# Patient Record
Sex: MALE | Race: WHITE | NOT HISPANIC OR LATINO | Employment: PART TIME | URBAN - METROPOLITAN AREA
[De-identification: names, ages, dates, MRNs, and addresses within clinical notes are randomized per-mention and may not be internally consistent; named-entity substitution may affect disease eponyms.]

---
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
## 2018-01-12 NOTE — PROGRESS NOTES
Assessment    1  Dyshidrotic eczema (705 81) (L30 1)   2  Cellulitis (682 9) (L03 90)    Plan  Dyshidrotic eczema    · Cephalexin 500 MG Oral Tablet (Cephalexin Monohydrate); 1 BID   · Triamcinolone Acetonide 0 5 % External Ointment; APPLY 2-3 TIMES DAILY TO  AFFECTED AREA(S)    Chief Complaint  Routine PE      Advance Directives  Advance Directive [facility]:   NO - Patient does not have an advance health care directive  History of Present Illness  HM, Adult Male: The patient is being seen for a health maintenance evaluation  Social History: Household members include parents  He is unmarried  Work status: working full time  The patient is a current cigarette smoker  He is quitting on his own  He reports never drinking alcohol and denies binge drinking  The patient has no concerns about alcohol abuse  He has never used illicit drugs  General Health: The patient's health since the last visit is described as good  He has regular dental visits  He denies vision problems  He denies hearing loss  Immunizations status: up to date  Lifestyle:  He does not have a healthy diet  He does not have any weight concerns  He exercises regularly  He uses tobacco  He denies alcohol use  He denies drug use  Reproductive health:  the patient is sexually active  Screening: Active Problems    1  Acute streptococcal tonsillitis (034 0) (J03 00)   2  Dyshidrotic eczema (705 81) (L30 1)   3  Pink eye (372 03) (H10 029)    Past Medical History    · History of Mononucleosis (075) (B27 90)    Family History  Father    · Family history of hypertension (V17 49) (Z82 49)   · Family history of High cholesterol    Social History    · Never a smoker    Current Meds   1  Ciprofloxacin HCl - 0 3 % Ophthalmic Solution; 1GTT AFFECTED EYE EVERY 6-8   HOURS; Therapy: [id number] to (Last Rx:23May2016)  Requested for: [id number] Ordered   2   Clotrimazole-Betamethasone 1-0 05 % External Cream; APPLY  AND RUB  IN A THIN   FILM TO AFFECTED AREAS TWICE DAILY  (AM AND PM); Therapy: [id number] to (Last Rx:11Mar2017) Ordered   3  Triamcinolone Acetonide 0 5 % External Ointment; APPLY 2-3 TIMES DAILY TO   AFFECTED AREA(S); Therapy: [id number] to (Last Rx:11Mar2017)  Requested for: [id number] Ordered    Allergies    1  No Known Drug Allergies    Vitals   Recorded: 20Nov2017 01:57PM   Heart Rate 104   Systolic 147   Diastolic 70   Height 6 ft 1 in   Weight 300 lb    BMI Calculated 39 58   BSA Calculated 2 56   BMI Percentile 99 %   2-20 Stature Percentile 89 %   2-20 Weight Percentile 99 %   O2 Saturation 97     Physical Exam    Constitutional   General appearance: No acute distress, well appearing and well nourished  Neck   Neck: Supple, symmetric, trachea midline, no masses  Thyroid: Normal, no thyromegaly  Pulmonary   Auscultation of lungs: Clear to auscultation  Cardiovascular   Auscultation of heart: Normal rate and rhythm, normal S1 and S2, no murmurs  Pedal pulses: 2+ bilaterally  Abdomen   Abdomen: Non-tender, no masses  Liver and spleen: No hepatomegaly or splenomegaly  Results/Data  PHQ-2 Adult Depression Screening 20Nov2017 02:00PM User, s     Test Name Result Flag Reference   PHQ-2 Adult Depression Score 0     Over the last two weeks, how often have you been bothered by any of the following problems?   Little interest or pleasure in doing things: Not at all - 0  Feeling down, depressed, or hopeless: Not at all - 0   PHQ-2 Adult Depression Screening Negative         Signatures   Electronically signed by : Calvin Messer MD; Nov 20 2017  2:23PM EST                       (Author)

## 2018-01-14 VITALS
WEIGHT: 300 LBS | OXYGEN SATURATION: 97 % | HEART RATE: 101 BPM | DIASTOLIC BLOOD PRESSURE: 70 MMHG | BODY MASS INDEX: 39.76 KG/M2 | HEIGHT: 73 IN | SYSTOLIC BLOOD PRESSURE: 128 MMHG

## 2018-01-18 NOTE — PROGRESS NOTES
Assessment    1  Dyshidrotic eczema (705 81) (L30 1)    Plan  Dyshidrotic eczema    · Clotrimazole-Betamethasone 1-0 05 % External Cream; APPLY  AND RUB  IN A  THIN FILM TO AFFECTED AREAS TWICE DAILY  (AM AND PM)   · Triamcinolone Acetonide 0 5 % External Ointment; APPLY 2-3 TIMES DAILY TO  AFFECTED AREA(S)    Chief Complaint  ATHLETES FOOT      History of Present Illness  HPI: see CC      Review of Systems    Constitutional: no fever or chills, feels well, no tiredness, no recent weight loss or weight gain  Integumentary: as noted in HPI  Active Problems    1  Acute streptococcal tonsillitis (034 0) (J03 00)   2  Pink eye (372 03) (H10 029)    Past Medical History    1  History of Mononucleosis (075) (B27 90)  Active Problems And Past Medical History Reviewed: The active problems and past medical history were reviewed and updated today  Family History  Father    1  Family history of hypertension (V17 49) (Z82 49)   2  Family history of High cholesterol    Social History    · Never a smoker    Current Meds   1  Ciprofloxacin HCl - 0 3 % Ophthalmic Solution; 1GTT AFFECTED EYE EVERY 6-8   HOURS; Therapy: [id number] to (Last Rx:[id number])  Requested for: 41YVJ3354 Ordered    Allergies    1  No Known Drug Allergies    Vitals   Recorded: 01MSJ8358 53:67NU   Systolic 046   Diastolic 76   Height 6 ft 1 in   Weight 300 lb    BMI Calculated 39 58   BSA Calculated 2 56   BMI Percentile 99 %   2-20 Stature Percentile 89 %   2-20 Weight Percentile 99 %     Physical Exam    Constitutional   General appearance: No acute distress, well appearing and well nourished  Skin   Examination of the skin for lesions: Abnormal   feet eczema          Signatures   Electronically signed by : Ruebns Devine MD; Mar 11 2017  9:08AM EST                       (Author)

## 2018-01-22 VITALS
DIASTOLIC BLOOD PRESSURE: 76 MMHG | HEIGHT: 73 IN | SYSTOLIC BLOOD PRESSURE: 120 MMHG | BODY MASS INDEX: 39.76 KG/M2 | WEIGHT: 300 LBS

## 2018-04-26 ENCOUNTER — HOSPITAL ENCOUNTER (EMERGENCY)
Facility: HOSPITAL | Age: 18
Discharge: HOME | End: 2018-04-26
Admitting: EMERGENCY MEDICINE
Payer: OTHER MISCELLANEOUS

## 2018-04-26 VITALS
DIASTOLIC BLOOD PRESSURE: 98 MMHG | RESPIRATION RATE: 20 BRPM | BODY MASS INDEX: 40.63 KG/M2 | WEIGHT: 300 LBS | OXYGEN SATURATION: 98 % | SYSTOLIC BLOOD PRESSURE: 184 MMHG | HEIGHT: 72 IN | TEMPERATURE: 98.5 F | HEART RATE: 114 BPM

## 2018-04-26 DIAGNOSIS — S81.812A LACERATION OF LEFT LOWER EXTREMITY, INITIAL ENCOUNTER: Primary | ICD-10-CM

## 2018-04-26 PROCEDURE — 99283 EMERGENCY DEPT VISIT LOW MDM: CPT

## 2018-04-26 PROCEDURE — 90471 IMMUNIZATION ADMIN: CPT

## 2018-04-26 PROCEDURE — 90715 TDAP VACCINE 7 YRS/> IM: CPT | Performed by: PHYSICIAN ASSISTANT

## 2018-04-26 RX ORDER — GINSENG 100 MG
1 CAPSULE ORAL ONCE
Status: COMPLETED | OUTPATIENT
Start: 2018-04-26 | End: 2018-04-26

## 2018-04-26 RX ORDER — LIDOCAINE HYDROCHLORIDE 10 MG/ML
5 INJECTION, SOLUTION EPIDURAL; INFILTRATION; INTRACAUDAL; PERINEURAL ONCE
Status: COMPLETED | OUTPATIENT
Start: 2018-04-26 | End: 2018-04-26

## 2018-04-26 RX ADMIN — LIDOCAINE HYDROCHLORIDE 5 ML: 10 INJECTION, SOLUTION EPIDURAL; INFILTRATION; INTRACAUDAL; PERINEURAL at 15:30

## 2018-04-26 RX ADMIN — Medication 1 SMALL APPLICATION: at 15:35

## 2018-04-26 RX ADMIN — TETANUS TOXOID, REDUCED DIPHTHERIA TOXOID AND ACELLULAR PERTUSSIS VACCINE, ADSORBED 0.5 ML: 5; 2.5; 8; 8; 2.5 SUSPENSION INTRAMUSCULAR at 16:00
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
## 2018-04-26 NOTE — DISCHARGE INSTRUCTIONS
Watch for signs of infection (redness, swelling, warmth, pus or drainage, streaking redness or fevers)  If they develop follow up or return to the ER  Laceration   WHAT YOU NEED TO KNOW:   A laceration is an injury to the skin and the soft tissue underneath it  Lacerations happen when you are cut or hit by something  They can happen anywhere on the body  DISCHARGE INSTRUCTIONS:   Return to the emergency department if:   · You have heavy bleeding or bleeding that does not stop after 10 minutes of holding firm, direct pressure over the wound  · Your wound opens up  Contact your healthcare provider if:   · You have a fever or chills  · Your laceration is red, warm, or swollen  · You have red streaks on your skin coming from your wound  · You have white or yellow drainage from the wound that smells bad  · You have pain that gets worse, even after treatment  · You have questions or concerns about your condition or care  Medicines:   · Prescription pain medicine  may be given  Ask how to take this medicine safely  · Antibiotics  help treat or prevent a bacterial infection  · Take your medicine as directed  Contact your healthcare provider if you think your medicine is not helping or if you have side effects  Tell him or her if you are allergic to any medicine  Keep a list of the medicines, vitamins, and herbs you take  Include the amounts, and when and why you take them  Bring the list or the pill bottles to follow-up visits  Carry your medicine list with you in case of an emergency  Care for your wound as directed:   · Do not get your wound wet  until your healthcare provider says it is okay  Do not soak your wound in water  Do not go swimming until your healthcare provider says it is okay  Carefully wash the wound with soap and water  Gently pat the area dry or allow it to air dry  · Change your bandages  when they get wet, dirty, or after washing   Apply new, clean bandages as directed  Do not apply elastic bandages or tape too tight  Do not put powders or lotions over your incision  · Apply antibiotic ointment as directed  Your healthcare provider may give you antibiotic ointment to put over your wound if you have stitches  If you have strips of tape over your incision, let them dry up and fall off on their own  If they do not fall off within 14 days, gently remove them  If you have glue over your wound, do not remove or pick at it  If your glue comes off, do not replace it with glue that you have at home  · Check your wound every day for signs of infection such as swelling, redness, or pus  Self-care:   · Apply ice  on your wound for 15 to 20 minutes every hour or as directed  Use an ice pack, or put crushed ice in a plastic bag  Cover it with a towel  Ice helps prevent tissue damage and decreases swelling and pain  · Use a splint as directed  A splint will decrease movement and stress on your wound  It may help it heal faster  A splint may be used for lacerations over joints or areas of your body that bend  Ask your healthcare provider how to apply and remove a splint  · Decrease scarring of your wound  by applying ointments as directed  Do not apply ointments until your healthcare provider says it is okay  You may need to wait until your wound is healed  Ask which ointment to buy and how often to use it  After your wound is healed, use sunscreen over the area when you are out in the sun  You should do this for at least 6 months to 1 year after your injury  Follow up with your healthcare provider as directed: You may need to follow up in 24 to 48 hours to have your wound checked for infection  You will need to return in 3 to 14 days if you have stitches or staples so they can be removed  Care for your wound as directed to prevent infection and help it heal  Write down your questions so you remember to ask them during your visits    © 2017 [address]  Information is for End User's use only and may not be sold, redistributed or otherwise used for commercial purposes  All illustrations and images included in CareNotes® are the copyrighted property of [employer]  or [facility]  The above information is an  only  It is not intended as medical advice for individual conditions or treatments  Talk to your doctor, nurse or pharmacist before following any medical regimen to see if it is safe and effective for you  Care For Your Stitches   WHAT YOU NEED TO KNOW:   Stitches, or sutures, are used to close cuts and wounds on the skin  Stitches need to be removed after your wound has healed  DISCHARGE INSTRUCTIONS:   Return to the emergency department if:   · Your stitches come apart  · Blood soaks through your bandages  · You suddenly cannot move your injured joint  · You have sudden numbness around your wound  · You see red streaks coming from your wound  Contact your healthcare provider if:   · You have a fever and chills  · Your wound is red, warm, swollen, or leaking pus  · There is a bad smell coming from your wound  · You have increased pain in the wound area  · You have questions or concerns about your condition or care  Care for your stitches:  Keep your stitches clean and dry  You may need to cover your stitches with a bandage for 24 to 48 hours, or as directed  Do not bump or hit the suture area, as this could open the wound  Do not trim or shorten the ends of your stitches  If they rub on your clothing, put a gauze bandage between the stitches and your clothes  Clean your wound:  Carefully wash your wound with soap and water  For mouth and lip wounds, rinse your mouth after meals and at bedtime  Ask your healthcare provider what to use to rinse your mouth  If you have a scalp wound, you may gently wash your hair every 2 days with mild shampoo  Do not use hair products, such as hair spray    Help your wound heal:   · Elevate  your wound above the level of your heart as often as you can  This will help decrease swelling and pain  Prop your wound on pillows or blankets to keep it elevated comfortably  · Limit activity  Do not stretch the skin around your wound  This will help prevent bleeding and swelling of the wound area  Follow up with your healthcare provider as directed: You may need to return to have your stitches removed  Write down your questions so you remember to ask them during your visits  © 2017 [address] Information is for End User's use only and may not be sold, redistributed or otherwise used for commercial purposes  All illustrations and images included in CareNotes® are the copyrighted property of [employer]  or [employer]  The above information is an  only  It is not intended as medical advice for individual conditions or treatments  Talk to your doctor, nurse or pharmacist before following any medical regimen to see if it is safe and effective for you

## 2018-06-11 ENCOUNTER — TELEPHONE (OUTPATIENT)
Dept: FAMILY MEDICINE CLINIC | Facility: CLINIC | Age: 18
End: 2018-06-11

## 2018-06-22 DIAGNOSIS — R52 PAIN: Primary | ICD-10-CM

## 2018-06-22 RX ORDER — CLOTRIMAZOLE AND BETAMETHASONE DIPROPIONATE 10; .64 MG/G; MG/G
CREAM TOPICAL 2 TIMES DAILY
COMMUNITY
Start: 2017-03-11 | End: 2019-02-12 | Stop reason: ALTCHOICE

## 2018-06-22 RX ORDER — TRIAMCINOLONE ACETONIDE 5 MG/G
OINTMENT TOPICAL 3 TIMES DAILY
COMMUNITY
Start: 2017-03-11 | End: 2019-02-12 | Stop reason: ALTCHOICE

## 2018-06-22 RX ORDER — PREDNISONE 20 MG/1
TABLET ORAL
Qty: 15 TABLET | Refills: 0 | Status: SHIPPED | OUTPATIENT
Start: 2018-06-22 | End: 2018-07-27 | Stop reason: SDUPTHER

## 2018-06-22 NOTE — TELEPHONE ENCOUNTER
ECZEMA EXTREMELY BAD ON FEET  WORSE THAN EVER  IN PAIN AND DIFFICULT TO WALK  USING CREAM HE WAS GIVEN FOR THIS  WHAT ELSE CAN THEY DO?    [phone]

## 2018-07-27 DIAGNOSIS — R52 PAIN: ICD-10-CM

## 2018-07-27 RX ORDER — PREDNISONE 20 MG/1
TABLET ORAL
Qty: 15 TABLET | Refills: 0 | Status: SHIPPED | OUTPATIENT
Start: 2018-07-27 | End: 2019-02-12 | Stop reason: ALTCHOICE

## 2019-02-12 ENCOUNTER — OFFICE VISIT (OUTPATIENT)
Dept: FAMILY MEDICINE CLINIC | Facility: CLINIC | Age: 19
End: 2019-02-12
Payer: COMMERCIAL

## 2019-02-12 VITALS
WEIGHT: 300 LBS | HEIGHT: 72 IN | HEART RATE: 76 BPM | OXYGEN SATURATION: 98 % | BODY MASS INDEX: 40.63 KG/M2 | SYSTOLIC BLOOD PRESSURE: 126 MMHG | TEMPERATURE: 98.1 F | DIASTOLIC BLOOD PRESSURE: 88 MMHG

## 2019-02-12 DIAGNOSIS — J01.00 ACUTE MAXILLARY SINUSITIS, RECURRENCE NOT SPECIFIED: Primary | ICD-10-CM

## 2019-02-12 PROCEDURE — 3008F BODY MASS INDEX DOCD: CPT | Performed by: NURSE PRACTITIONER

## 2019-02-12 PROCEDURE — 1036F TOBACCO NON-USER: CPT | Performed by: NURSE PRACTITIONER

## 2019-02-12 PROCEDURE — 99213 OFFICE O/P EST LOW 20 MIN: CPT | Performed by: NURSE PRACTITIONER

## 2019-02-12 RX ORDER — AMOXICILLIN AND CLAVULANATE POTASSIUM 875; 125 MG/1; MG/1
1 TABLET, FILM COATED ORAL EVERY 12 HOURS SCHEDULED
Qty: 20 TABLET | Refills: 0 | Status: SHIPPED | OUTPATIENT
Start: 2019-02-12 | End: 2019-02-22

## 2019-02-12 RX ORDER — PREDNISONE 20 MG/1
TABLET ORAL
Qty: 15 TABLET | Refills: 0 | Status: SHIPPED | OUTPATIENT
Start: 2019-02-12 | End: 2019-09-05 | Stop reason: ALTCHOICE

## 2019-02-12 NOTE — PROGRESS NOTES
[facility]        NAME: Johnny Olivares is a 22 y o  male  : 2000    MRN: [id number]  DATE: 2019  TIME: 9:26 AM    Assessment and Plan   Acute maxillary sinusitis, recurrence not specified [J01 00]  1  Acute maxillary sinusitis, recurrence not specified  amoxicillin-clavulanate (AUGMENTIN) 875-125 mg per tablet    predniSONE 20 mg tablet         Patient Instructions     Patient Instructions   Discussed viral vs bacterial infection  Augmentin and Prednisone as directed  Continue OTC cough/cold medications as directed  Call or return for problems/concerns              Chief Complaint     Chief Complaint   Patient presents with    Sore Throat     for 2 days-- slight cough          History of Present Illness       Sinus congestion, productive cough, sore throat started a few days ago  Symptoms are getting worse  Tried DayQuil with no relief  No fever      Review of Systems   Review of Systems   Constitutional: Negative for activity change, chills, fatigue and fever  HENT: Positive for congestion, postnasal drip, rhinorrhea, sinus pressure and sore throat  Negative for ear pain  Eyes: Negative for pain, discharge and redness  Respiratory: Positive for cough and chest tightness  Negative for shortness of breath and wheezing  Cardiovascular: Negative for chest pain  Gastrointestinal: Negative for constipation, diarrhea, nausea and vomiting  Musculoskeletal: Negative for myalgias  Skin: Negative for rash  Neurological: Negative for dizziness and headaches           Current Medications       Current Outpatient Medications:     amoxicillin-clavulanate (AUGMENTIN) 875-125 mg per tablet, Take 1 tablet by mouth every 12 (twelve) hours for 10 days, Disp: 20 tablet, Rfl: 0    predniSONE 20 mg tablet, 20mg po bid x 5 days, then 20mg po daily x 5 days, Disp: 15 tablet, Rfl: 0    Current Allergies     Allergies as of 2019    (No Known Allergies) The following portions of the patient's history were reviewed and updated as appropriate: allergies, current medications, past family history, past medical history, past social history, past surgical history and problem list      History reviewed  No pertinent past medical history  History reviewed  No pertinent surgical history  History reviewed  No pertinent family history  Medications have been verified  Objective   /88   Pulse 76   Temp 98 1 °F (36 7 °C) (Oral)   Ht 6' (1 829 m)   Wt 136 kg (300 lb)   SpO2 98%   BMI 40 69 kg/m²        Physical Exam     Physical Exam   Constitutional: He is oriented to person, place, and time  He appears well-developed and well-nourished  No distress  HENT:   Head: Normocephalic and atraumatic  Right Ear: Tympanic membrane, external ear and ear canal normal    Left Ear: Tympanic membrane, external ear and ear canal normal    Nose: Rhinorrhea present  No epistaxis  Right sinus exhibits maxillary sinus tenderness  Left sinus exhibits maxillary sinus tenderness  Mouth/Throat: Uvula is midline and mucous membranes are normal  Posterior oropharyngeal erythema present  Cardiovascular: Normal rate, regular rhythm and normal heart sounds  Exam reveals no gallop and no friction rub  No murmur heard  Pulmonary/Chest: Effort normal and breath sounds normal  No respiratory distress  He has no wheezes  He has no rales  Abdominal: He exhibits no distension  Musculoskeletal: Normal range of motion  Neurological: He is alert and oriented to person, place, and time  Skin: He is not diaphoretic  Psychiatric: He has a normal mood and affect  His behavior is normal  Judgment and thought content normal    Nursing note and vitals reviewed

## 2019-02-12 NOTE — PATIENT INSTRUCTIONS
Discussed viral vs bacterial infection  Augmentin and Prednisone as directed  Continue OTC cough/cold medications as directed  Call or return for problems/concerns

## 2019-09-05 ENCOUNTER — OFFICE VISIT (OUTPATIENT)
Dept: FAMILY MEDICINE CLINIC | Facility: CLINIC | Age: 19
End: 2019-09-05
Payer: COMMERCIAL

## 2019-09-05 VITALS
SYSTOLIC BLOOD PRESSURE: 124 MMHG | HEART RATE: 81 BPM | DIASTOLIC BLOOD PRESSURE: 74 MMHG | HEIGHT: 72 IN | OXYGEN SATURATION: 97 % | WEIGHT: 308 LBS | BODY MASS INDEX: 41.72 KG/M2

## 2019-09-05 DIAGNOSIS — J01.10 ACUTE NON-RECURRENT FRONTAL SINUSITIS: Primary | ICD-10-CM

## 2019-09-05 DIAGNOSIS — Z00.00 WELLNESS EXAMINATION: ICD-10-CM

## 2019-09-05 PROCEDURE — 99213 OFFICE O/P EST LOW 20 MIN: CPT | Performed by: FAMILY MEDICINE

## 2019-09-05 PROCEDURE — 3008F BODY MASS INDEX DOCD: CPT | Performed by: FAMILY MEDICINE

## 2019-09-05 PROCEDURE — 99395 PREV VISIT EST AGE 18-39: CPT | Performed by: FAMILY MEDICINE

## 2019-09-05 RX ORDER — AMOXICILLIN 500 MG/1
500 CAPSULE ORAL EVERY 8 HOURS SCHEDULED
Qty: 30 CAPSULE | Refills: 0 | Status: SHIPPED | OUTPATIENT
Start: 2019-09-05 | End: 2019-09-15

## 2019-09-05 RX ORDER — PREDNISOLONE 15 MG/5 ML
SOLUTION, ORAL ORAL
Qty: 70 ML | Refills: 1 | Status: SHIPPED | OUTPATIENT
Start: 2019-09-05

## 2019-09-05 RX ORDER — FLUTICASONE PROPIONATE 50 MCG
1 SPRAY, SUSPENSION (ML) NASAL DAILY
Qty: 1 BOTTLE | Refills: 5 | Status: SHIPPED | OUTPATIENT
Start: 2019-09-05

## 2019-09-05 NOTE — PROGRESS NOTES
[facility]        NAME: James Wilson is a 22 y o  male  : 2000    MRN: [id number]  DATE: 2019  TIME: 9:57 AM    Assessment and Plan   Acute non-recurrent frontal sinusitis [J01 10]  1  Acute non-recurrent frontal sinusitis     2  Wellness examination           Patient Instructions     Patient Instructions   See meds          Chief Complaint     Chief Complaint   Patient presents with    Earache     x 2days    Sinusitis     x 2days         History of Present Illness       C/o sinus Sx 1 wk      Review of Systems   Review of Systems   Constitutional: Negative for fatigue, fever and unexpected weight change  HENT: Positive for ear pain, postnasal drip, sinus pressure, sinus pain and sore throat  Negative for congestion  Eyes: Negative for visual disturbance  Respiratory: Negative for shortness of breath and wheezing  Cardiovascular: Negative for chest pain and palpitations  Gastrointestinal: Negative for abdominal pain, diarrhea, nausea and vomiting  Current Medications     No current outpatient medications on file  Current Allergies     Allergies as of 2019    (No Known Allergies)            The following portions of the patient's history were reviewed and updated as appropriate: allergies, current medications, past family history, past medical history, past social history, past surgical history and problem list      History reviewed  No pertinent past medical history  History reviewed  No pertinent surgical history  Family History   Problem Relation Age of Onset    No Known Problems Mother     No Known Problems Father          Medications have been verified  Objective   /74   Pulse 81   Ht 6' (1 829 m)   Wt (!) 140 kg (308 lb)   SpO2 97%   BMI 41 77 kg/m²        Physical Exam     Physical Exam   Constitutional: He is oriented to person, place, and time  He appears well-developed and well-nourished   No distress  HENT:   Head: Normocephalic and atraumatic  Right Ear: Tympanic membrane, external ear and ear canal normal    Left Ear: Tympanic membrane, external ear and ear canal normal    Nose: Rhinorrhea present  No epistaxis  Right sinus exhibits maxillary sinus tenderness  Left sinus exhibits maxillary sinus tenderness  Mouth/Throat: Uvula is midline, oropharynx is clear and moist and mucous membranes are normal    Cardiovascular: Normal rate, regular rhythm and normal heart sounds  Exam reveals no gallop and no friction rub  No murmur heard  Pulmonary/Chest: Effort normal and breath sounds normal  No respiratory distress  He has no wheezes  He has no rales  Abdominal: He exhibits no distension  Musculoskeletal: Normal range of motion  Neurological: He is alert and oriented to person, place, and time  Skin: He is not diaphoretic  Psychiatric: He has a normal mood and affect  His behavior is normal  Judgment and thought content normal    Nursing note and vitals reviewed

## 2019-09-05 NOTE — PROGRESS NOTES
HPI:  Mohini Ochoa is a 22 y o  male here for his yearly health maintenance exam    There is no problem list on file for this patient  History reviewed  No pertinent past medical history  1  Advanced Directive: n     2  Durable Power of  for Healthcare: n     3  Social History:           Drug and alcohol History: n                  4  Immunizations up to date: y                 Lifestyle:                           Healthy Diet:n                          Alcohol Use:y                          Tobacco Use:n                          Regular exercise:y                          Weight concerns:y                               5  Over the past 2 weeks, how often have you been bothered by the following:              Little interest or pleasure in doing things:n              Felling down, depressed or hopeless:n       Current Outpatient Medications   Medication Sig Dispense Refill    amoxicillin (AMOXIL) 500 mg capsule Take 1 capsule (500 mg total) by mouth every 8 (eight) hours for 10 days 30 capsule 0    fluticasone (FLONASE) 50 mcg/act nasal spray 1 spray into each nostril daily 1 Bottle 5    prednisoLONE (PRELONE) 15 MG/5ML syrup 5cc bid for 7 days 70 mL 1     No current facility-administered medications for this visit  No Known Allergies  Immunization History   Administered Date(s) Administered    Tdap 06/25/2009, 04/26/2018       Patient Care Team:  Davidson Michel MD as PCP - General    Review of Systems   Constitutional: Negative for fatigue, fever and unexpected weight change  HENT: Negative for congestion, sinus pressure and sore throat  Eyes: Negative for visual disturbance  Respiratory: Negative for shortness of breath and wheezing  Cardiovascular: Negative for chest pain and palpitations  Gastrointestinal: Negative for abdominal pain, diarrhea, nausea and vomiting  Physical Exam :  Physical Exam   Constitutional: He appears well-developed and well-nourished  No distress  HENT:   Right Ear: Tympanic membrane, external ear and ear canal normal  Tympanic membrane is not injected  Left Ear: Tympanic membrane, external ear and ear canal normal  Tympanic membrane is not injected  Nose: Nose normal    Mouth/Throat: Uvula is midline, oropharynx is clear and moist and mucous membranes are normal    Eyes: Pupils are equal, round, and reactive to light  Conjunctivae are normal    Neck: Normal range of motion  Neck supple  No thyromegaly present  Cardiovascular: Normal rate, regular rhythm and normal heart sounds  No murmur heard  Pulmonary/Chest: Effort normal and breath sounds normal  No respiratory distress  He has no wheezes  Lymphadenopathy:     He has no cervical adenopathy  Skin: He is not diaphoretic  BMI Counseling: Body mass index is 41 77 kg/m²  Discussed the patient's BMI with him  The BMI is above average  BMI counseling and education was provided to the patient  Nutrition recommendations include reducing portion sizes  Assessment and Plan:  1  Acute non-recurrent frontal sinusitis  fluticasone (FLONASE) 50 mcg/act nasal spray    amoxicillin (AMOXIL) 500 mg capsule    prednisoLONE (PRELONE) 15 MG/5ML syrup   2   Wellness examination         Health Maintenance Due   Topic Date Due    BMI: Followup Plan  01/31/2016    INFLUENZA VACCINE  07/01/2019

## 2023-01-10 ENCOUNTER — OFFICE VISIT (OUTPATIENT)
Dept: FAMILY MEDICINE CLINIC | Facility: CLINIC | Age: 23
End: 2023-01-10

## 2023-01-10 VITALS
OXYGEN SATURATION: 98 % | BODY MASS INDEX: 42.66 KG/M2 | TEMPERATURE: 98.7 F | HEIGHT: 72 IN | WEIGHT: 315 LBS | HEART RATE: 93 BPM

## 2023-01-10 DIAGNOSIS — R06.02 SOB (SHORTNESS OF BREATH): ICD-10-CM

## 2023-01-10 DIAGNOSIS — Z13.29 THYROID DISORDER SCREEN: ICD-10-CM

## 2023-01-10 DIAGNOSIS — R07.89 CHEST WALL TENDERNESS: ICD-10-CM

## 2023-01-10 DIAGNOSIS — J30.9 ALLERGIC RHINITIS, UNSPECIFIED SEASONALITY, UNSPECIFIED TRIGGER: ICD-10-CM

## 2023-01-10 DIAGNOSIS — Z00.00 ANNUAL PHYSICAL EXAM: Primary | ICD-10-CM

## 2023-01-10 RX ORDER — FLUTICASONE PROPIONATE 50 MCG
1 SPRAY, SUSPENSION (ML) NASAL DAILY
Qty: 11.1 ML | Refills: 1 | Status: SHIPPED | OUTPATIENT
Start: 2023-01-10

## 2023-01-10 RX ORDER — ALBUTEROL SULFATE 90 UG/1
2 AEROSOL, METERED RESPIRATORY (INHALATION) EVERY 6 HOURS PRN
Qty: 8.5 G | Refills: 1 | Status: SHIPPED | OUTPATIENT
Start: 2023-01-10

## 2023-01-10 NOTE — PATIENT INSTRUCTIONS
Fasting labs as ordered  Flonase/albuterol as ordered for allergy symptoms/shortness of breath  Ibuprofen as directed for chest wall tenderness/avoid heavy lifting strenuous activity until symptoms improve  Call with problems/concerns  Wellness Visit for Adults   AMBULATORY CARE:   A wellness visit  is when you see your healthcare provider to get screened for health problems  Your healthcare provider will also give you advice on how to stay healthy  Write down your questions so you remember to ask them  Ask your healthcare provider how often you should have a wellness visit  What happens at a wellness visit:  Your healthcare provider will ask about your health, and your family history of health problems  This includes high blood pressure, heart disease, and cancer  He or she will ask if you have symptoms that concern you, if you smoke, and about your mood  You may also be asked about your intake of medicines, supplements, food, and alcohol  Any of the following may be done: Your weight  will be checked  Your height may also be checked so your body mass index (BMI) can be calculated  Your BMI shows if you are at a healthy weight  Your blood pressure  and heart rate will be checked  Your temperature may also be checked  Blood and urine tests  may be done  Blood tests may be done to check your cholesterol levels  Abnormal cholesterol levels increase your risk for heart disease and stroke  You may also need a blood or urine test to check for diabetes if you are at increased risk  Urine tests may be done to look for signs of an infection or kidney disease  A physical exam  includes checking your heartbeat and lungs with a stethoscope  Your healthcare provider may also check your skin to look for sun damage  Screening tests  may be recommended  A screening test is done to check for diseases that may not cause symptoms   The screening tests you may need depend on your age, gender, family history, and lifestyle habits  For example, colorectal screening may be recommended if you are 48years old or older  Screening tests you need if you are a woman:   A Pap smear  is used to screen for cervical cancer  Pap smears are usually done every 3 to 5 years depending on your age  You may need them more often if you have had abnormal Pap smear test results in the past  Ask your healthcare provider how often you should have a Pap smear  A mammogram  is an x-ray of your breasts to screen for breast cancer  Experts recommend mammograms every 2 years starting at age 48 years  You may need a mammogram at age 52 years or younger if you have an increased risk for breast cancer  Talk to your healthcare provider about when you should start having mammograms and how often you need them  Vaccines you may need:   Get an influenza vaccine  every year  The influenza vaccine protects you from the flu  Several types of viruses cause the flu  The viruses change over time, so new vaccines are made each year  Get a tetanus-diphtheria (Td) booster vaccine  every 10 years  This vaccine protects you against tetanus and diphtheria  Tetanus is a severe infection that may cause painful muscle spasms and lockjaw  Diphtheria is a severe bacterial infection that causes a thick covering in the back of your mouth and throat  Get a human papillomavirus (HPV) vaccine  if you are female and aged 23 to 32 or male 23 to 24 and never received it  This vaccine protects you from HPV infection  HPV is the most common infection spread by sexual contact  HPV may also cause vaginal, penile, and anal cancers  Get a pneumococcal vaccine  if you are aged 72 years or older  The pneumococcal vaccine is an injection given to protect you from pneumococcal disease  Pneumococcal disease is an infection caused by pneumococcal bacteria  The infection may cause pneumonia, meningitis, or an ear infection      Get a shingles vaccine  if you are 60 or older, even if you have had shingles before  The shingles vaccine is an injection to protect you from the varicella-zoster virus  This is the same virus that causes chickenpox  Shingles is a painful rash that develops in people who had chickenpox or have been exposed to the virus  How to eat healthy:  My Plate is a model for planning healthy meals  It shows the types and amounts of foods that should go on your plate  Fruits and vegetables make up about half of your plate, and grains and protein make up the other half  A serving of dairy is included on the side of your plate  The amount of calories and serving sizes you need depends on your age, gender, weight, and height  Examples of healthy foods are listed below:  Eat a variety of vegetables  such as dark green, red, and orange vegetables  You can also include canned vegetables low in sodium (salt) and frozen vegetables without added butter or sauces  Eat a variety of fresh fruits , canned fruit in 100% juice, frozen fruit, and dried fruit  Include whole grains  At least half of the grains you eat should be whole grains  Examples include whole-wheat bread, wheat pasta, brown rice, and whole-grain cereals such as oatmeal     Eat a variety of protein foods such as seafood (fish and shellfish), lean meat, and poultry without skin (turkey and chicken)  Examples of lean meats include pork leg, shoulder, or tenderloin, and beef round, sirloin, tenderloin, and extra lean ground beef  Other protein foods include eggs and egg substitutes, beans, peas, soy products, nuts, and seeds  Choose low-fat dairy products such as skim or 1% milk or low-fat yogurt, cheese, and cottage cheese  Limit unhealthy fats  such as butter, hard margarine, and shortening  Exercise:  Exercise at least 30 minutes per day on most days of the week  Some examples of exercise include walking, biking, dancing, and swimming   You can also fit in more physical activity by taking the stairs instead of the elevator or parking farther away from stores  Include muscle strengthening activities 2 days each week  Regular exercise provides many health benefits  It helps you manage your weight, and decreases your risk for type 2 diabetes, heart disease, stroke, and high blood pressure  Exercise can also help improve your mood  Ask your healthcare provider about the best exercise plan for you  General health and safety guidelines:   Do not smoke  Nicotine and other chemicals in cigarettes and cigars can cause lung damage  Ask your healthcare provider for information if you currently smoke and need help to quit  E-cigarettes or smokeless tobacco still contain nicotine  Talk to your healthcare provider before you use these products  Limit alcohol  A drink of alcohol is 12 ounces of beer, 5 ounces of wine, or 1½ ounces of liquor  Lose weight, if needed  Being overweight increases your risk of certain health conditions  These include heart disease, high blood pressure, type 2 diabetes, and certain types of cancer  Protect your skin  Do not sunbathe or use tanning beds  Use sunscreen with a SPF 15 or higher  Apply sunscreen at least 15 minutes before you go outside  Reapply sunscreen every 2 hours  Wear protective clothing, hats, and sunglasses when you are outside  Drive safely  Always wear your seatbelt  Make sure everyone in your car wears a seatbelt  A seatbelt can save your life if you are in an accident  Do not use your cell phone when you are driving  This could distract you and cause an accident  Pull over if you need to make a call or send a text message  Practice safe sex  Use latex condoms if are sexually active and have more than one partner  Your healthcare provider may recommend screening tests for sexually transmitted infections (STIs)  Wear helmets, lifejackets, and protective gear    Always wear a helmet when you ride a bike or motorcycle, go skiing, or play sports that could cause a head injury  Wear protective equipment when you play sports  Wear a lifejacket when you are on a boat or doing water sports  © Copyright [employer] 2022 Information is for End User's use only and may not be sold, redistributed or otherwise used for commercial purposes  All illustrations and images included in CareNotes® are the copyrighted property of A D A M [employer] Inc  or [facility] Blanca Vieira   The above information is an  only  It is not intended as medical advice for individual conditions or treatments  Talk to your doctor, nurse or pharmacist before following any medical regimen to see if it is safe and effective for you

## 2023-01-10 NOTE — PROGRESS NOTES
[address]    NAME: Anatoly Elaine  AGE: 23 y o  SEX: male  : 2000     DATE: 1/10/2023     Assessment and Plan:     Problem List Items Addressed This Visit    None  Visit Diagnoses     Annual physical exam    -  Primary    Relevant Orders    Comprehensive metabolic panel    Lipid panel    Thyroid disorder screen        Relevant Medications    fluticasone (FLONASE) 50 mcg/act nasal spray    Other Relevant Orders    TSH, 3rd generation with Free T4 reflex    Acute non-recurrent frontal sinusitis        Allergic rhinitis, unspecified seasonality, unspecified trigger        Relevant Medications    albuterol (Proventil HFA) 90 mcg/act inhaler    SOB (shortness of breath)        Relevant Medications    albuterol (Proventil HFA) 90 mcg/act inhaler          Immunizations and preventive care screenings were discussed with patient today  Appropriate education was printed on patient's after visit summary  Counseling:  Alcohol/drug use: discussed moderation in alcohol intake, the recommendations for healthy alcohol use, and avoidance of illicit drug use  Dental Health: discussed importance of regular tooth brushing, flossing, and dental visits  Injury prevention: discussed safety/seat belts, safety helmets, smoke detectors, carbon dioxide detectors, and smoking near bedding or upholstery  Sexual health: discussed sexually transmitted diseases, partner selection, use of condoms, avoidance of unintended pregnancy, and contraceptive alternatives  · Exercise: the importance of regular exercise/physical activity was discussed  Recommend exercise 3-5 times per week for at least 30 minutes  BMI Counseling: Body mass index is 47 06 kg/m²   The BMI is above normal  Nutrition recommendations include decreasing portion sizes, encouraging healthy choices of fruits and vegetables, moderation in carbohydrate intake and increasing intake of lean protein  Exercise recommendations include exercising 3-5 times per week  Rationale for BMI follow-up plan is due to patient being overweight or obese  Depression Screening and Follow-up Plan: Patient was screened for depression during today's encounter  They screened negative with a PHQ-2 score of 0  No follow-ups on file  Chief Complaint:     Chief Complaint   Patient presents with   • Chest Pain     Pt went to the ER over the weekend  He had and Xray & EKG done, everything was normal   He is still feeling pains off and on  • Shortness of Breath      History of Present Illness:     Adult Annual Physical   Patient here for a comprehensive physical exam  The patient reports problems - f/up chest pain  Diet and Physical Activity  · Diet/Nutrition: poor diet  · Exercise: no formal exercise  Depression Screening  PHQ-2/9 Depression Screening    Little interest or pleasure in doing things: 0 - not at all  Feeling down, depressed, or hopeless: 0 - not at all  PHQ-2 Score: 0  PHQ-2 Interpretation: Negative depression screen       General Health  · Sleep: sleeps well  · Hearing: normal - bilateral   · Vision: goes for regular eye exams and wears glasses  · Dental: no dental visits for >1 year and brushes teeth three times daily   Health  · History of STDs?: no      Review of Systems:     Review of Systems   Constitutional: Negative for activity change, appetite change, chills, diaphoresis, fatigue, fever and unexpected weight change  HENT: Positive for postnasal drip  Negative for congestion, dental problem, drooling, ear discharge, ear pain, facial swelling, hearing loss, mouth sores, nosebleeds, rhinorrhea, sinus pressure, sinus pain, sneezing, sore throat, tinnitus, trouble swallowing and voice change  Eyes: Negative for photophobia, pain, discharge, redness, itching and visual disturbance  Respiratory: Positive for shortness of breath   Negative for apnea, cough, choking, chest tightness, wheezing and stridor  Cardiovascular: Positive for chest pain  Negative for palpitations and leg swelling  Chest wall tenderness   Gastrointestinal: Negative for abdominal distention, abdominal pain, anal bleeding, blood in stool, constipation, diarrhea, nausea, rectal pain and vomiting  Endocrine: Negative for cold intolerance, heat intolerance, polydipsia, polyphagia and polyuria  Genitourinary: Negative for decreased urine volume, difficulty urinating, dysuria, enuresis, flank pain, frequency, genital sores, hematuria, penile discharge, penile pain, penile swelling, scrotal swelling, testicular pain and urgency  Musculoskeletal: Negative for arthralgias, back pain, gait problem, joint swelling, myalgias, neck pain and neck stiffness  Skin: Negative for color change, rash and wound  Allergic/Immunologic: Negative for food allergies  Neurological: Negative for dizziness, tremors, seizures, syncope, facial asymmetry, speech difficulty, weakness, light-headedness, numbness and headaches  Hematological: Negative for adenopathy  Does not bruise/bleed easily  Psychiatric/Behavioral: Negative for agitation, behavioral problems, confusion, decreased concentration, dysphoric mood, hallucinations, self-injury, sleep disturbance and suicidal ideas  The patient is not nervous/anxious and is not hyperactive  Past Medical History:     History reviewed  No pertinent past medical history  Past Surgical History:     History reviewed  No pertinent surgical history     Social History:     Social History     Socioeconomic History   • Marital status: /Civil Union     Spouse name: None   • Number of children: None   • Years of education: None   • Highest education level: None   Occupational History   • None   Tobacco Use   • Smoking status: Former     Packs/day: 0 50     Types: Cigarettes     Quit date: 2018     Years since quittin 9   • Smokeless tobacco: Never   Substance and Sexual Activity   • Alcohol use: No   • Drug use: No   • Sexual activity: None   Other Topics Concern   • None   Social History Narrative   • None     Social Determinants of Health     Financial Resource Strain: Not on file   Food Insecurity: Not on file   Transportation Needs: Not on file   Physical Activity: Not on file   Stress: Not on file   Social Connections: Not on file   Intimate Partner Violence: Not on file   Housing Stability: Not on file      Family History:     Family History   Problem Relation Age of Onset   • No Known Problems Mother    • Hypertension Father       Current Medications:     Current Outpatient Medications   Medication Sig Dispense Refill   • albuterol (Proventil HFA) 90 mcg/act inhaler Inhale 2 puffs every 6 (six) hours as needed for wheezing 8 5 g 1   • fluticasone (FLONASE) 50 mcg/act nasal spray 1 spray into each nostril daily 11 1 mL 1     No current facility-administered medications for this visit  Allergies:     No Known Allergies   Physical Exam:     Pulse 93   Temp 98 7 °F (37 1 °C) (Tympanic)   Ht 6' (1 829 m)   Wt (!) 157 kg (347 lb)   SpO2 98%   BMI 47 06 kg/m²     Physical Exam  Vitals and nursing note reviewed  Constitutional:       General: He is not in acute distress  Appearance: He is well-developed  He is obese  He is not ill-appearing, toxic-appearing or diaphoretic  HENT:      Head: Normocephalic  Right Ear: Tympanic membrane, ear canal and external ear normal  There is no impacted cerumen  Left Ear: Tympanic membrane, ear canal and external ear normal  There is no impacted cerumen  Nose: Nose normal  No congestion or rhinorrhea  Mouth/Throat:      Mouth: Mucous membranes are moist       Pharynx: Oropharynx is clear  No oropharyngeal exudate or posterior oropharyngeal erythema  Eyes:      General:         Right eye: No discharge  Left eye: No discharge        Extraocular Movements: Extraocular movements intact  Pupils: Pupils are equal, round, and reactive to light  Neck:      Thyroid: No thyromegaly  Vascular: No carotid bruit, hepatojugular reflux or JVD  Trachea: No tracheal deviation  Cardiovascular:      Rate and Rhythm: Normal rate and regular rhythm  Pulses: Normal pulses  Heart sounds: Normal heart sounds  Heart sounds not distant  No murmur heard  No systolic murmur is present  No diastolic murmur is present  No friction rub  No gallop  Pulmonary:      Effort: Pulmonary effort is normal  No tachypnea, accessory muscle usage or respiratory distress  Breath sounds: Normal breath sounds  No stridor  No decreased breath sounds, wheezing, rhonchi or rales  Chest:      Chest wall: Tenderness (left chest wall) present  Abdominal:      General: Bowel sounds are normal  There is no distension or abdominal bruit  Palpations: There is no hepatomegaly, splenomegaly or mass  Tenderness: There is no abdominal tenderness  There is no guarding or rebound  Hernia: No hernia is present  Musculoskeletal:         General: No swelling, tenderness, deformity or signs of injury  Normal range of motion  Cervical back: Normal range of motion and neck supple  No rigidity or tenderness  Right lower leg: No tenderness  No edema  Left lower leg: No tenderness  No edema  Lymphadenopathy:      Cervical: No cervical adenopathy  Skin:     General: Skin is warm and dry  Capillary Refill: Capillary refill takes less than 2 seconds  Coloration: Skin is not cyanotic or pale  Findings: No bruising, ecchymosis, erythema or rash  Nails: There is no clubbing  Neurological:      General: No focal deficit present  Mental Status: He is alert and oriented to person, place, and time  Cranial Nerves: No cranial nerve deficit  Sensory: No sensory deficit  Motor: No weakness        Coordination: Coordination normal       Gait: Gait normal       Deep Tendon Reflexes: Reflexes normal    Psychiatric:         Mood and Affect: Mood normal  Mood is not anxious  Behavior: Behavior normal  Behavior is not agitated  Thought Content:  Thought content normal          Judgment: Judgment normal             Janina Parker, [address] [facility]

## 2023-01-10 NOTE — PROGRESS NOTES
[facility]        NAME: Fredo Burnett is a 23 y o  male  : 2000    MRN: [id number]  DATE: 2023  TIME: 9:30 AM    Assessment and Plan   Annual physical exam [Z00 00]  1  Annual physical exam  Comprehensive metabolic panel    Lipid panel    Comprehensive metabolic panel    Lipid panel      2  Thyroid disorder screen  TSH, 3rd generation with Free T4 reflex    TSH, 3rd generation with Free T4 reflex    fluticasone (FLONASE) 50 mcg/act nasal spray      3  Allergic rhinitis, unspecified seasonality, unspecified trigger  albuterol (Proventil HFA) 90 mcg/act inhaler      4  SOB (shortness of breath)  albuterol (Proventil HFA) 90 mcg/act inhaler      5  Chest wall tenderness              Patient Instructions     Patient Instructions     Fasting labs as ordered  Flonase/albuterol as ordered for allergy symptoms/shortness of breath  Ibuprofen as directed for chest wall tenderness/avoid heavy lifting strenuous activity until symptoms improve  Call with problems/concerns  Wellness Visit for Adults   AMBULATORY CARE:   A wellness visit  is when you see your healthcare provider to get screened for health problems  Your healthcare provider will also give you advice on how to stay healthy  Write down your questions so you remember to ask them  Ask your healthcare provider how often you should have a wellness visit  What happens at a wellness visit:  Your healthcare provider will ask about your health, and your family history of health problems  This includes high blood pressure, heart disease, and cancer  He or she will ask if you have symptoms that concern you, if you smoke, and about your mood  You may also be asked about your intake of medicines, supplements, food, and alcohol  Any of the following may be done:  · Your weight  will be checked  Your height may also be checked so your body mass index (BMI) can be calculated   Your BMI shows if you are at a healthy weight  · Your blood pressure  and heart rate will be checked  Your temperature may also be checked  · Blood and urine tests  may be done  Blood tests may be done to check your cholesterol levels  Abnormal cholesterol levels increase your risk for heart disease and stroke  You may also need a blood or urine test to check for diabetes if you are at increased risk  Urine tests may be done to look for signs of an infection or kidney disease  · A physical exam  includes checking your heartbeat and lungs with a stethoscope  Your healthcare provider may also check your skin to look for sun damage  · Screening tests  may be recommended  A screening test is done to check for diseases that may not cause symptoms  The screening tests you may need depend on your age, gender, family history, and lifestyle habits  For example, colorectal screening may be recommended if you are 48years old or older  Screening tests you need if you are a woman:   · A Pap smear  is used to screen for cervical cancer  Pap smears are usually done every 3 to 5 years depending on your age  You may need them more often if you have had abnormal Pap smear test results in the past  Ask your healthcare provider how often you should have a Pap smear  · A mammogram  is an x-ray of your breasts to screen for breast cancer  Experts recommend mammograms every 2 years starting at age 48 years  You may need a mammogram at age 52 years or younger if you have an increased risk for breast cancer  Talk to your healthcare provider about when you should start having mammograms and how often you need them  Vaccines you may need:   · Get an influenza vaccine  every year  The influenza vaccine protects you from the flu  Several types of viruses cause the flu  The viruses change over time, so new vaccines are made each year  · Get a tetanus-diphtheria (Td) booster vaccine  every 10 years  This vaccine protects you against tetanus and diphtheria  Tetanus is a severe infection that may cause painful muscle spasms and lockjaw  Diphtheria is a severe bacterial infection that causes a thick covering in the back of your mouth and throat  · Get a human papillomavirus (HPV) vaccine  if you are female and aged 23 to 32 or male 23 to 24 and never received it  This vaccine protects you from HPV infection  HPV is the most common infection spread by sexual contact  HPV may also cause vaginal, penile, and anal cancers  · Get a pneumococcal vaccine  if you are aged 72 years or older  The pneumococcal vaccine is an injection given to protect you from pneumococcal disease  Pneumococcal disease is an infection caused by pneumococcal bacteria  The infection may cause pneumonia, meningitis, or an ear infection  · Get a shingles vaccine  if you are 60 or older, even if you have had shingles before  The shingles vaccine is an injection to protect you from the varicella-zoster virus  This is the same virus that causes chickenpox  Shingles is a painful rash that develops in people who had chickenpox or have been exposed to the virus  How to eat healthy:  My Plate is a model for planning healthy meals  It shows the types and amounts of foods that should go on your plate  Fruits and vegetables make up about half of your plate, and grains and protein make up the other half  A serving of dairy is included on the side of your plate  The amount of calories and serving sizes you need depends on your age, gender, weight, and height  Examples of healthy foods are listed below:  · Eat a variety of vegetables  such as dark green, red, and orange vegetables  You can also include canned vegetables low in sodium (salt) and frozen vegetables without added butter or sauces  · Eat a variety of fresh fruits , canned fruit in 100% juice, frozen fruit, and dried fruit  · Include whole grains  At least half of the grains you eat should be whole grains   Examples include whole-wheat bread, wheat pasta, brown rice, and whole-grain cereals such as oatmeal     · Eat a variety of protein foods such as seafood (fish and shellfish), lean meat, and poultry without skin (turkey and chicken)  Examples of lean meats include pork leg, shoulder, or tenderloin, and beef round, sirloin, tenderloin, and extra lean ground beef  Other protein foods include eggs and egg substitutes, beans, peas, soy products, nuts, and seeds  · Choose low-fat dairy products such as skim or 1% milk or low-fat yogurt, cheese, and cottage cheese  · Limit unhealthy fats  such as butter, hard margarine, and shortening  Exercise:  Exercise at least 30 minutes per day on most days of the week  Some examples of exercise include walking, biking, dancing, and swimming  You can also fit in more physical activity by taking the stairs instead of the elevator or parking farther away from stores  Include muscle strengthening activities 2 days each week  Regular exercise provides many health benefits  It helps you manage your weight, and decreases your risk for type 2 diabetes, heart disease, stroke, and high blood pressure  Exercise can also help improve your mood  Ask your healthcare provider about the best exercise plan for you  General health and safety guidelines:   · Do not smoke  Nicotine and other chemicals in cigarettes and cigars can cause lung damage  Ask your healthcare provider for information if you currently smoke and need help to quit  E-cigarettes or smokeless tobacco still contain nicotine  Talk to your healthcare provider before you use these products  · Limit alcohol  A drink of alcohol is 12 ounces of beer, 5 ounces of wine, or 1½ ounces of liquor  · Lose weight, if needed  Being overweight increases your risk of certain health conditions  These include heart disease, high blood pressure, type 2 diabetes, and certain types of cancer  · Protect your skin  Do not sunbathe or use tanning beds  Use sunscreen with a SPF 15 or higher  Apply sunscreen at least 15 minutes before you go outside  Reapply sunscreen every 2 hours  Wear protective clothing, hats, and sunglasses when you are outside  · Drive safely  Always wear your seatbelt  Make sure everyone in your car wears a seatbelt  A seatbelt can save your life if you are in an accident  Do not use your cell phone when you are driving  This could distract you and cause an accident  Pull over if you need to make a call or send a text message  · Practice safe sex  Use latex condoms if are sexually active and have more than one partner  Your healthcare provider may recommend screening tests for sexually transmitted infections (STIs)  · Wear helmets, lifejackets, and protective gear  Always wear a helmet when you ride a bike or motorcycle, go skiing, or play sports that could cause a head injury  Wear protective equipment when you play sports  Wear a lifejacket when you are on a boat or doing water sports  © Copyright [employer] 2022 Information is for End User's use only and may not be sold, redistributed or otherwise used for commercial purposes  All illustrations and images included in CareNotes® are the copyrighted property of A D A M , Inc  or [facility]  The above information is an  only  It is not intended as medical advice for individual conditions or treatments  Talk to your doctor, nurse or pharmacist before following any medical regimen to see if it is safe and effective for you  Chief Complaint     Chief Complaint   Patient presents with   • Chest Pain     Pt went to the ER over the weekend  He had and Xray & EKG done, everything was normal   He is still feeling pains off and on     • Shortness of Breath         History of Present Illness       F/up [facility] ED visit 1/8/23 for chest pain-labs, EKG, chest xray all normal  States pain has improved since then-still has intermittent pain right chest wall that will sometimes radiate to shoulder-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  Has some shortness of breath due to allergies-needs refill flonase and inhaler  Review of Systems   Review of Systems   Constitutional: Negative for activity change and fatigue  HENT: Positive for congestion and postnasal drip  Negative for ear pain, facial swelling, rhinorrhea, sinus pressure, sinus pain, sore throat and trouble swallowing  Respiratory: Positive for shortness of breath  Negative for cough, chest tightness and wheezing  Neurological: Negative for dizziness, tremors, weakness, light-headedness, numbness and headaches  Current Medications       Current Outpatient Medications:   •  albuterol (Proventil HFA) 90 mcg/act inhaler, Inhale 2 puffs every 6 (six) hours as needed for wheezing, Disp: 8 5 g, Rfl: 1  •  fluticasone (FLONASE) 50 mcg/act nasal spray, 1 spray into each nostril daily, Disp: 11 1 mL, Rfl: 1    Current Allergies     Allergies as of 01/10/2023   • (No Known Allergies)            The following portions of the patient's history were reviewed and updated as appropriate: allergies, current medications, past family history, past medical history, past social history, past surgical history and problem list      History reviewed  No pertinent past medical history  History reviewed  No pertinent surgical history  Family History   Problem Relation Age of Onset   • No Known Problems Mother    • Hypertension Father          Medications have been verified  Objective   Pulse 93   Temp 98 7 °F (37 1 °C) (Tympanic)   Ht 6' (1 829 m)   Wt (!) 157 kg (347 lb)   SpO2 98%   BMI 47 06 kg/m²        Physical Exam     Physical Exam  Vitals and nursing note reviewed  Constitutional:       General: He is not in acute distress  Appearance: He is obese  He is not ill-appearing, toxic-appearing or diaphoretic  HENT:      Head: Normocephalic        Right Ear: Tympanic membrane, ear canal and external ear normal  There is no impacted cerumen  Left Ear: Tympanic membrane, ear canal and external ear normal  There is no impacted cerumen  Ears:      Comments: Clear fluid b/l ears     Nose: Nose normal  No congestion or rhinorrhea  Mouth/Throat:      Mouth: Mucous membranes are moist       Pharynx: Oropharynx is clear  No oropharyngeal exudate or posterior oropharyngeal erythema  Comments: PND present  Eyes:      Extraocular Movements: Extraocular movements intact  Conjunctiva/sclera: Conjunctivae normal       Pupils: Pupils are equal, round, and reactive to light  Neck:      Thyroid: No thyromegaly  Vascular: No carotid bruit or JVD  Cardiovascular:      Rate and Rhythm: Normal rate and regular rhythm  Heart sounds: Normal heart sounds  Heart sounds not distant  No murmur heard  No systolic murmur is present  No friction rub  No gallop  Pulmonary:      Effort: Pulmonary effort is normal  No tachypnea, accessory muscle usage or respiratory distress  Breath sounds: Normal breath sounds  No stridor  No decreased breath sounds or rhonchi  Chest:      Chest wall: No mass, deformity, tenderness, crepitus or edema  There is no dullness to percussion  Abdominal:      General: Bowel sounds are normal       Palpations: There is no hepatomegaly, splenomegaly or mass  Tenderness: There is no abdominal tenderness  There is no guarding or rebound  Musculoskeletal:         General: No swelling  Normal range of motion  Cervical back: Normal range of motion and neck supple  No rigidity or tenderness  Right lower leg: No tenderness  No edema  Left lower leg: No tenderness  No edema  Lymphadenopathy:      Cervical: No cervical adenopathy  Skin:     General: Skin is warm and dry  Capillary Refill: Capillary refill takes less than 2 seconds  Coloration: Skin is not cyanotic or pale  Findings: No erythema     Neurological: General: No focal deficit present  Mental Status: He is alert and oriented to person, place, and time  Cranial Nerves: No cranial nerve deficit  Motor: No weakness  Psychiatric:         Mood and Affect: Mood normal  Mood is not anxious  Behavior: Behavior normal  Behavior is not agitated  Thought Content:  Thought content normal          Judgment: Judgment normal            PHQ-2/9 Depression Screening    Little interest or pleasure in doing things: 0 - not at all  Feeling down, depressed, or hopeless: 0 - not at all  PHQ-2 Score: 0  PHQ-2 Interpretation: Negative depression screen

## 2023-01-11 ENCOUNTER — TELEMEDICINE (OUTPATIENT)
Dept: FAMILY MEDICINE CLINIC | Facility: CLINIC | Age: 23
End: 2023-01-11

## 2023-01-11 DIAGNOSIS — M54.12 CERVICAL RADICULOPATHY: Primary | ICD-10-CM

## 2023-01-11 RX ORDER — PREDNISONE 20 MG/1
TABLET ORAL
Qty: 15 TABLET | Refills: 0 | Status: SHIPPED | OUTPATIENT
Start: 2023-01-11

## 2023-01-11 NOTE — PROGRESS NOTES
Virtual Regular Visit    Verification of patient location:    Patient is located in the following state in which I hold an active license [place]      Assessment/Plan:    Problem List Items Addressed This Visit    None  Visit Diagnoses     Cervical radiculopathy    -  Primary    Relevant Medications    predniSONE 20 mg tablet               Reason for visit is   Chief Complaint   Patient presents with   • Chest Pain     Chest pain is random and goes down left arm then travels, arm cold to touch when that happens  Was seen yesterday but says he thinks something was missed   • Shortness of Breath        Encounter provider Diamond Richardson MD    Provider located at [address] [place] [address]      Recent Visits  Date Type Provider Dept   01/10/23 Office Visit ALVIN [facility] [address] [facility] [place] recent visits within past 7 days and meeting all other requirements  Today's Visits  Date Type Provider Dept   01/11/23 Telemedicine Diamond Richardson MD [facility] [address] [facility] [place] today's visits and meeting all other requirements  Future Appointments  No visits were found meeting these conditions  Showing future appointments within next 150 days and meeting all other requirements       The patient was identified by name and date of birth  Claudia Burt was informed that this is a telemedicine visit and that the visit is being conducted through the [address] [employer] platform  He agrees to proceed     My office door was closed  No one else was in the room  He acknowledged consent and understanding of privacy and security of the video platform  The patient has agreed to participate and understands they can discontinue the visit at any time  Patient is aware this is a billable service       Subjective  Claudia Burt is a 23 y o  male recent eval for arm numbness/chest pain--cardiac eval neg---pt works w/ loads above shoulder level--Sx c/w TOS/brachial plexus injury   HPI     History reviewed  No pertinent past medical history  History reviewed  No pertinent surgical history  Current Outpatient Medications   Medication Sig Dispense Refill   • albuterol (Proventil HFA) 90 mcg/act inhaler Inhale 2 puffs every 6 (six) hours as needed for wheezing 8 5 g 1   • fluticasone (FLONASE) 50 mcg/act nasal spray 1 spray into each nostril daily 11 1 mL 1   • predniSONE 20 mg tablet TAKE 1 TABLET BID X 5 DAYS THEN TAKE 1 TABLET QD FOR 5 DAYS 15 tablet 0     No current facility-administered medications for this visit  No Known Allergies    Review of Systems   Constitutional: Negative for fatigue, fever and unexpected weight change  HENT: Negative for congestion, sinus pain and sore throat  Eyes: Negative for visual disturbance  Respiratory: Negative for shortness of breath and wheezing  Cardiovascular: Negative for chest pain and palpitations  Gastrointestinal: Negative for abdominal pain, nausea and vomiting  Musculoskeletal: Negative  Negative for arthralgias and myalgias  Neurological: Negative for syncope, weakness and numbness  Psychiatric/Behavioral: Negative  Negative for confusion, dysphoric mood and suicidal ideas  L arm numbnessVideo Exam    There were no vitals filed for this visit      Physical Exam  Pulmonary:      Effort: Pulmonary effort is normal           I spent 15 minutes directly with the patient during this visit

## 2023-01-12 ENCOUNTER — TELEPHONE (OUTPATIENT)
Dept: FAMILY MEDICINE CLINIC | Facility: CLINIC | Age: 23
End: 2023-01-12

## 2023-01-13 ENCOUNTER — TELEPHONE (OUTPATIENT)
Dept: FAMILY MEDICINE CLINIC | Facility: CLINIC | Age: 23
End: 2023-01-13

## 2023-01-13 NOTE — TELEPHONE ENCOUNTER
PT AWARE    ----- Message from [address] [place] [address],[address] sent at 1/13/2023  8:59 AM EST -----  Cholesterol is high-lifestyle modifications-diet/exercise-decrease fats,sugars,carbs in diet